# Patient Record
Sex: MALE | Race: WHITE | NOT HISPANIC OR LATINO | URBAN - METROPOLITAN AREA
[De-identification: names, ages, dates, MRNs, and addresses within clinical notes are randomized per-mention and may not be internally consistent; named-entity substitution may affect disease eponyms.]

---

## 2022-06-04 ENCOUNTER — TELEPHONE ENCOUNTER (OUTPATIENT)
Dept: URBAN - METROPOLITAN AREA CLINIC 68 | Facility: CLINIC | Age: 77
End: 2022-06-04

## 2022-06-04 RX ORDER — TRAMADOL HYDROCHLORIDE 50 MG/1
TRAMADOL HCL( 50MG ORAL  AS NEEDED ) INACTIVE -HX ENTRY TABLET ORAL AS NEEDED
OUTPATIENT
Start: 2014-12-02

## 2022-06-04 RX ORDER — TAMSULOSIN HYDROCHLORIDE 0.4 MG/1
CAPSULE ORAL
OUTPATIENT
Start: 2008-10-08 | End: 2014-06-19

## 2022-06-04 RX ORDER — TRAMADOL HYDROCHLORIDE 50 MG/1
TRAMADOL HCL(    1 TABLET EVERY 4 TO 6 HOURS AS NEEDED ) INACTIVE -HX ENTRY TABLET ORAL
OUTPATIENT
Start: 2008-10-08

## 2022-06-04 RX ORDER — TRAMADOL HYDROCHLORIDE 50 MG/1
TABLET ORAL
OUTPATIENT
Start: 2008-11-17 | End: 2014-06-19

## 2022-06-04 RX ORDER — SUCRALFATE 1 G/1
TABLET ORAL
Qty: 120 | Refills: 120 | OUTPATIENT
Start: 2014-06-19 | End: 2014-12-02

## 2022-06-04 RX ORDER — ONDANSETRON 4 MG/1
TABLET, ORALLY DISINTEGRATING ORAL
Qty: 21 | Refills: 21 | OUTPATIENT
Start: 2014-12-03 | End: 2015-12-02

## 2022-06-04 RX ORDER — TRAMADOL HYDROCHLORIDE 50 MG/1
TABLET ORAL
OUTPATIENT
Start: 2008-10-08 | End: 2014-06-19

## 2022-06-04 RX ORDER — ZOLPIDEM TARTRATE 12.5 MG/1
ZOLPIDEM TARTRATE ER( 12.5MG ORAL  DAILY ) INACTIVE -HX ENTRY TABLET, EXTENDED RELEASE ORAL DAILY
OUTPATIENT
Start: 2015-12-02

## 2022-06-04 RX ORDER — PHENOBARBITAL, HYOSCYAMINE SULFATE, ATROPINE SULFATE, SCOPOLAMINE HYDROBROMIDE 16.2; .1037; .0194; .0065 MG/1; MG/1; MG/1; MG/1
TABLET ORAL
Qty: 120 | Refills: 120 | OUTPATIENT
Start: 2014-06-30 | End: 2014-12-02

## 2022-06-04 RX ORDER — ESOMEPRAZOLE MAGNESIUM 40 MG
CAPSULE,DELAYED RELEASE (ENTERIC COATED) ORAL
OUTPATIENT
Start: 2008-12-03 | End: 2014-06-19

## 2022-06-04 RX ORDER — DEXLANSOPRAZOLE 60 MG/1
DEXILANT( 60MG ORAL  DAILY ) INACTIVE -HX ENTRY CAPSULE, DELAYED RELEASE ORAL DAILY
OUTPATIENT
Start: 2014-12-31

## 2022-06-04 RX ORDER — ESOMEPRAZOLE MAGNESIUM 40 MG
NEXIUM(    1 CAPSULE DAILY ) INACTIVE -HX ENTRY CAPSULE,DELAYED RELEASE (ENTERIC COATED) ORAL
OUTPATIENT
Start: 2008-10-08

## 2022-06-05 ENCOUNTER — TELEPHONE ENCOUNTER (OUTPATIENT)
Dept: URBAN - METROPOLITAN AREA CLINIC 68 | Facility: CLINIC | Age: 77
End: 2022-06-05

## 2022-06-05 RX ORDER — DEXLANSOPRAZOLE 60 MG/1
CAPSULE, DELAYED RELEASE ORAL
Qty: 30 | Refills: 30 | Status: ACTIVE | COMMUNITY
Start: 2015-12-02

## 2022-06-25 ENCOUNTER — TELEPHONE ENCOUNTER (OUTPATIENT)
Age: 77
End: 2022-06-25

## 2022-06-25 RX ORDER — SUCRALFATE 1 G/1
TABLET ORAL
Qty: 120 | Refills: 120 | OUTPATIENT
Start: 2014-06-19 | End: 2014-12-02

## 2022-06-25 RX ORDER — ONDANSETRON 4 MG/1
TABLET, ORALLY DISINTEGRATING ORAL
Qty: 21 | Refills: 21 | OUTPATIENT
Start: 2014-12-03 | End: 2015-12-02

## 2022-06-25 RX ORDER — TRAMADOL HYDROCHLORIDE 50 MG/1
TABLET ORAL
OUTPATIENT
Start: 2008-10-08 | End: 2014-06-19

## 2022-06-25 RX ORDER — TRAMADOL HYDROCHLORIDE 50 MG/1
TRAMADOL HCL(    1 TABLET EVERY 4 TO 6 HOURS AS NEEDED ) INACTIVE -HX ENTRY TABLET ORAL
OUTPATIENT
Start: 2008-10-08

## 2022-06-25 RX ORDER — TAMSULOSIN HCL 0.4 MG
CAPSULE ORAL
OUTPATIENT
Start: 2008-10-08 | End: 2014-06-19

## 2022-06-25 RX ORDER — PHENOBARBITAL, HYOSCYAMINE SULFATE, ATROPINE SULFATE, SCOPOLAMINE HYDROBROMIDE 16.2; .1037; .0194; .0065 MG/1; MG/1; MG/1; MG/1
TABLET ORAL
Qty: 120 | Refills: 120 | OUTPATIENT
Start: 2014-06-30 | End: 2014-12-02

## 2022-06-25 RX ORDER — ESOMEPRAZOLE MAGNESIUM 40 MG
CAPSULE,DELAYED RELEASE (ENTERIC COATED) ORAL
OUTPATIENT
Start: 2008-12-03 | End: 2014-06-19

## 2022-06-25 RX ORDER — SODIUM SULFATE, POTASSIUM SULFATE, MAGNESIUM SULFATE 17.5; 3.13; 1.6 G/ML; G/ML; G/ML
SOLUTION, CONCENTRATE ORAL AS DIRECTED
Qty: 1 | Refills: 0 | OUTPATIENT
Start: 2014-06-23 | End: 2014-12-02

## 2022-06-25 RX ORDER — TRAMADOL HYDROCHLORIDE 50 MG/1
TABLET ORAL
OUTPATIENT
Start: 2008-11-17 | End: 2014-06-19

## 2022-06-25 RX ORDER — DEXLANSOPRAZOLE 60 MG/1
DEXILANT( 60MG ORAL  DAILY ) INACTIVE -HX ENTRY CAPSULE, DELAYED RELEASE ORAL DAILY
OUTPATIENT
Start: 2014-12-31

## 2022-06-25 RX ORDER — ZOLPIDEM TARTRATE 12.5 MG/1
ZOLPIDEM TARTRATE ER( 12.5MG ORAL  DAILY ) INACTIVE -HX ENTRY TABLET, EXTENDED RELEASE ORAL DAILY
OUTPATIENT
Start: 2015-12-02

## 2022-06-25 RX ORDER — TRAMADOL HYDROCHLORIDE 50 MG/1
TRAMADOL HCL( 50MG ORAL  AS NEEDED ) INACTIVE -HX ENTRY TABLET ORAL AS NEEDED
OUTPATIENT
Start: 2014-12-02

## 2022-06-25 RX ORDER — FAMOTIDINE 10 MG
RANITIDINE 75( 75MG ORAL  QHS ) INACTIVE -HX ENTRY TABLET ORAL QHS
OUTPATIENT
Start: 2015-12-02

## 2022-06-25 RX ORDER — ESOMEPRAZOLE MAGNESIUM 40 MG
NEXIUM(    1 CAPSULE DAILY ) INACTIVE -HX ENTRY CAPSULE,DELAYED RELEASE (ENTERIC COATED) ORAL
OUTPATIENT
Start: 2008-10-08

## 2022-06-26 ENCOUNTER — TELEPHONE ENCOUNTER (OUTPATIENT)
Age: 77
End: 2022-06-26

## 2022-06-26 RX ORDER — TAMSULOSIN HYDROCHLORIDE 0.4 MG/1
TAMSULOSIN HCL( 0.4MG ORAL  DAILY ) ACTIVE -HX ENTRY CAPSULE ORAL DAILY
Status: ACTIVE | COMMUNITY
Start: 2015-12-02

## 2022-06-26 RX ORDER — DEXLANSOPRAZOLE 60 MG/1
CAPSULE, DELAYED RELEASE ORAL
Qty: 30 | Refills: 30 | Status: ACTIVE | COMMUNITY
Start: 2015-12-02

## 2024-10-02 ENCOUNTER — OFFICE VISIT (OUTPATIENT)
Dept: URBAN - METROPOLITAN AREA CLINIC 68 | Facility: CLINIC | Age: 79
End: 2024-10-02
Payer: MEDICARE

## 2024-10-02 ENCOUNTER — LAB OUTSIDE AN ENCOUNTER (OUTPATIENT)
Dept: URBAN - METROPOLITAN AREA CLINIC 68 | Facility: CLINIC | Age: 79
End: 2024-10-02

## 2024-10-02 ENCOUNTER — DASHBOARD ENCOUNTERS (OUTPATIENT)
Age: 79
End: 2024-10-02

## 2024-10-02 VITALS
DIASTOLIC BLOOD PRESSURE: 78 MMHG | SYSTOLIC BLOOD PRESSURE: 118 MMHG | HEART RATE: 84 BPM | BODY MASS INDEX: 26.07 KG/M2 | OXYGEN SATURATION: 98 % | HEIGHT: 68 IN | WEIGHT: 172 LBS

## 2024-10-02 DIAGNOSIS — R10.13 EPIGASTRIC PAIN: ICD-10-CM

## 2024-10-02 DIAGNOSIS — K57.90 DIVERTICULOSIS: ICD-10-CM

## 2024-10-02 DIAGNOSIS — R74.8 ELEVATED LIVER ENZYMES: ICD-10-CM

## 2024-10-02 PROBLEM — 707724006: Status: ACTIVE | Noted: 2024-10-02

## 2024-10-02 PROBLEM — 397881000: Status: ACTIVE | Noted: 2024-10-02

## 2024-10-02 PROCEDURE — 99204 OFFICE O/P NEW MOD 45 MIN: CPT | Performed by: INTERNAL MEDICINE

## 2024-10-02 RX ORDER — DEXLANSOPRAZOLE 60 MG/1
CAPSULE, DELAYED RELEASE ORAL
Qty: 30 | Refills: 30 | Status: DISCONTINUED | COMMUNITY
Start: 2015-12-02

## 2024-10-02 RX ORDER — TAMSULOSIN HYDROCHLORIDE 0.4 MG/1
TAMSULOSIN HCL( 0.4MG ORAL  DAILY ) ACTIVE -HX ENTRY CAPSULE ORAL DAILY
Status: ACTIVE | COMMUNITY
Start: 2015-12-02

## 2024-10-02 RX ORDER — PANTOPRAZOLE SODIUM 40 MG/1
1 TABLET TABLET, DELAYED RELEASE ORAL ONCE A DAY
Qty: 90 TABLET | Refills: 0 | OUTPATIENT
Start: 2024-10-02

## 2024-10-02 NOTE — HPI-TODAY'S VISIT:
79 y.o. WM with history of cardiac arrhythmia and CVA last year in [place] who is on Eliquis and is here for evaluation of mid upper abdominal pain. He denies any gross gib. He describes ongoing dull upper abdominal pain for months and was noted to have some elevated liver enzymes in 9/2024 with AST and ALT in the low 100s. He does have chronic back pain and was taking a lot of Advil. He is recommended to have repeat CMP and will order a CT scan of abdomen/pelvis with contrast. Will plan for an EGD at [facility] and will need clearance from his Cardiologist.

## 2024-10-16 ENCOUNTER — LAB OUTSIDE AN ENCOUNTER (OUTPATIENT)
Dept: URBAN - METROPOLITAN AREA CLINIC 68 | Facility: CLINIC | Age: 79
End: 2024-10-16

## 2024-10-16 LAB
A/G RATIO: 1.9
ALBUMIN: 4.1
ALKALINE PHOSPHATASE: 74
ALT (SGPT): 23
AST (SGOT): 22
BILIRUBIN, TOTAL: 0.9
BUN/CREATININE RATIO: (no result)
BUN: 19
CALCIUM: 9.2
CARBON DIOXIDE, TOTAL: 31
CHLORIDE: 104
CREATININE: 1.01
EGFR: 76
GLOBULIN, TOTAL: 2.2
GLUCOSE: 107
POTASSIUM: 4.3
PROTEIN, TOTAL: 6.3
SODIUM: 142

## 2024-10-17 ENCOUNTER — TELEPHONE ENCOUNTER (OUTPATIENT)
Dept: URBAN - METROPOLITAN AREA CLINIC 68 | Facility: CLINIC | Age: 79
End: 2024-10-17

## 2024-10-21 ENCOUNTER — TELEPHONE ENCOUNTER (OUTPATIENT)
Dept: URBAN - METROPOLITAN AREA CLINIC 68 | Facility: CLINIC | Age: 79
End: 2024-10-21

## 2024-10-23 ENCOUNTER — OFFICE VISIT (OUTPATIENT)
Dept: URBAN - METROPOLITAN AREA CLINIC 68 | Facility: CLINIC | Age: 79
End: 2024-10-23

## 2024-11-07 ENCOUNTER — OFFICE VISIT (OUTPATIENT)
Dept: URBAN - METROPOLITAN AREA MEDICAL CENTER 21 | Facility: MEDICAL CENTER | Age: 79
End: 2024-11-07
Payer: MEDICARE

## 2024-11-07 DIAGNOSIS — K22.711 BARRETT ESOPHAGUS WITH HIGH GRADE DYSPLASIA: ICD-10-CM

## 2024-11-07 DIAGNOSIS — K31.7 POLYP OF DUODENUM: ICD-10-CM

## 2024-11-07 DIAGNOSIS — D00.1 CARCINOMA IN SITU OF ESOPHAGUS: ICD-10-CM

## 2024-11-07 DIAGNOSIS — R10.13 EPIGASTRIC PAIN: ICD-10-CM

## 2024-11-07 DIAGNOSIS — K29.80 DUODENITIS WITHOUT BLEEDING: ICD-10-CM

## 2024-11-07 DIAGNOSIS — K31.89 REACTIVE GASTROPATHY: ICD-10-CM

## 2024-11-07 DIAGNOSIS — K22.70 BARRETT'S ESOPHAGUS WITHOUT DYSPLASIA: ICD-10-CM

## 2024-11-07 PROCEDURE — 43251 EGD REMOVE LESION SNARE: CPT | Performed by: INTERNAL MEDICINE

## 2024-11-07 PROCEDURE — 43239 EGD BIOPSY SINGLE/MULTIPLE: CPT | Performed by: INTERNAL MEDICINE

## 2024-11-13 ENCOUNTER — TELEPHONE ENCOUNTER (OUTPATIENT)
Dept: URBAN - METROPOLITAN AREA CLINIC 68 | Facility: CLINIC | Age: 79
End: 2024-11-13

## 2024-11-15 ENCOUNTER — LAB OUTSIDE AN ENCOUNTER (OUTPATIENT)
Dept: URBAN - METROPOLITAN AREA CLINIC 68 | Facility: CLINIC | Age: 79
End: 2024-11-15

## 2024-11-15 ENCOUNTER — TELEPHONE ENCOUNTER (OUTPATIENT)
Dept: URBAN - METROPOLITAN AREA CLINIC 68 | Facility: CLINIC | Age: 79
End: 2024-11-15

## 2024-11-15 ENCOUNTER — OFFICE VISIT (OUTPATIENT)
Dept: URBAN - METROPOLITAN AREA CLINIC 68 | Facility: CLINIC | Age: 79
End: 2024-11-15
Payer: MEDICARE

## 2024-11-15 VITALS
HEART RATE: 84 BPM | HEIGHT: 68 IN | WEIGHT: 172 LBS | BODY MASS INDEX: 26.07 KG/M2 | SYSTOLIC BLOOD PRESSURE: 124 MMHG | DIASTOLIC BLOOD PRESSURE: 76 MMHG | OXYGEN SATURATION: 96 %

## 2024-11-15 DIAGNOSIS — K83.8 COMMON BILE DUCT DILATATION: ICD-10-CM

## 2024-11-15 DIAGNOSIS — K59.09 CHRONIC CONSTIPATION: ICD-10-CM

## 2024-11-15 DIAGNOSIS — K22.719 BARRETT'S ESOPHAGUS WITH DYSPLASIA: ICD-10-CM

## 2024-11-15 DIAGNOSIS — C15.9 ESOPHAGEAL ADENOCARCINOMA: ICD-10-CM

## 2024-11-15 DIAGNOSIS — R93.89 IMAGING ABNORMALITY: ICD-10-CM

## 2024-11-15 PROBLEM — 276803003: Status: ACTIVE | Noted: 2024-11-15

## 2024-11-15 PROBLEM — 168501001: Status: ACTIVE | Noted: 2024-11-15

## 2024-11-15 PROBLEM — 236069009: Status: ACTIVE | Noted: 2024-11-15

## 2024-11-15 PROBLEM — 1082751000119109: Status: ACTIVE | Noted: 2024-11-15

## 2024-11-15 PROBLEM — 123608004: Status: ACTIVE | Noted: 2024-11-15

## 2024-11-15 PROCEDURE — 99214 OFFICE O/P EST MOD 30 MIN: CPT | Performed by: INTERNAL MEDICINE

## 2024-11-15 RX ORDER — PANTOPRAZOLE SODIUM 40 MG/1
1 TABLET TABLET, DELAYED RELEASE ORAL ONCE A DAY
Qty: 90 TABLET | Refills: 0 | Status: ACTIVE | COMMUNITY
Start: 2024-10-02

## 2024-11-15 RX ORDER — ZOLPIDEM TARTRATE 5 MG/1
1 TABLET AT BEDTIME AS NEEDED TABLET, FILM COATED ORAL ONCE A DAY
Status: ACTIVE | COMMUNITY

## 2024-11-15 RX ORDER — TAMSULOSIN HYDROCHLORIDE 0.4 MG/1
TAMSULOSIN HCL( 0.4MG ORAL  DAILY ) ACTIVE -HX ENTRY CAPSULE ORAL DAILY
Status: ACTIVE | COMMUNITY
Start: 2015-12-02

## 2024-11-15 NOTE — HPI-TODAY'S VISIT:
79 y.o. WM with history of cardiac arrhythmia and CVA last year in [place] who is on Eliquis and is here s/p EGD on 11/7/2024 at [facility] to discuss findings. 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 pylori or celiac disease.  He describes ongoing dull upper abdominal pain for months and was noted to have some elevated liver enzymes in 9/2024 with AST and ALT in the low 100s. He does have chronic back pain and was taking a lot of Advil. A CT scan of abdomen/pelvis showed normal liver, post-cholecystectomy changes, there is appearance of 8mm filling defect in CBD. He is recommended to have an MRCP for evaluation of possible choledocholithiasis. He does have some constipation and is recommended to take Miralax. He will be referred to [facility] and [facility].

## 2024-11-19 ENCOUNTER — OFFICE VISIT (OUTPATIENT)
Dept: URBAN - METROPOLITAN AREA CLINIC 68 | Facility: CLINIC | Age: 79
End: 2024-11-19

## 2025-02-18 ENCOUNTER — OFFICE VISIT (OUTPATIENT)
Dept: URBAN - METROPOLITAN AREA CLINIC 68 | Facility: CLINIC | Age: 80
End: 2025-02-18

## 2025-07-17 NOTE — PHYSICAL EXAM GASTROINTESTINAL
Abdomen , soft, nontender, nondistended , no guarding or rigidity , no masses palpable , normal bowel sounds , Liver and Spleen , no hepatomegaly present , no hepatosplenomegaly , liver nontender , spleen not palpable
dentures